# Patient Record
Sex: FEMALE | Employment: STUDENT | URBAN - METROPOLITAN AREA
[De-identification: names, ages, dates, MRNs, and addresses within clinical notes are randomized per-mention and may not be internally consistent; named-entity substitution may affect disease eponyms.]

---

## 2020-06-02 ENCOUNTER — TELEPHONE (OUTPATIENT)
Dept: PRIMARY CARE CLINIC | Age: 35
End: 2020-06-02

## 2020-06-02 NOTE — TELEPHONE ENCOUNTER
Pt has not seen Dr. Jaycee Doss since 2013. She is requesting a copy of her immuniaztion/ titer records.     Please call back asap.     [phone]

## 2020-06-02 NOTE — TELEPHONE ENCOUNTER
Returned call to patient. Informed her that I can send out immunization records that we have on file. Patient requested for it to be mailed. Verified home address. Immunization record placed in mail today.

## 2020-07-06 ENCOUNTER — TELEPHONE (OUTPATIENT)
Dept: PRIMARY CARE CLINIC | Age: 35
End: 2020-07-06

## 2020-07-06 NOTE — TELEPHONE ENCOUNTER
Patient is requesting to speak to a nurse about a vaccine she had done back in 2010.  Stated she get hep b surface, I did not see that in her chart but she wants to speak to a nurse

## 2023-05-17 RX ORDER — DOXYCYCLINE 150 MG/1
150 TABLET ORAL
COMMUNITY

## 2023-05-17 RX ORDER — KETOCONAZOLE 20 MG/ML
SHAMPOO TOPICAL
COMMUNITY
Start: 2012-06-27

## 2023-05-17 RX ORDER — KETOCONAZOLE 20 MG/G
CREAM TOPICAL DAILY
COMMUNITY
Start: 2012-06-27

## 2023-05-17 RX ORDER — DAPSONE 50 MG/G
GEL TOPICAL
COMMUNITY

## 2023-05-17 RX ORDER — ADAPALENE AND BENZOYL PEROXIDE .1; 2.5 G/100G; G/100G
GEL TOPICAL
COMMUNITY